# Patient Record
Sex: FEMALE | Race: WHITE | Employment: PART TIME | URBAN - NONMETROPOLITAN AREA
[De-identification: names, ages, dates, MRNs, and addresses within clinical notes are randomized per-mention and may not be internally consistent; named-entity substitution may affect disease eponyms.]

---

## 2018-03-22 ENCOUNTER — OFFICE VISIT (OUTPATIENT)
Dept: OPTOMETRY | Age: 68
End: 2018-03-22
Payer: COMMERCIAL

## 2018-03-22 DIAGNOSIS — H52.4 ASTIGMATISM OF BOTH EYES WITH PRESBYOPIA: Primary | ICD-10-CM

## 2018-03-22 DIAGNOSIS — H52.203 ASTIGMATISM OF BOTH EYES WITH PRESBYOPIA: Primary | ICD-10-CM

## 2018-03-22 PROCEDURE — 92014 COMPRE OPH EXAM EST PT 1/>: CPT | Performed by: OPTOMETRIST

## 2018-03-22 RX ORDER — BENOXINATE HCL/FLUORESCEIN SOD 0.4%-0.25%
1 DROPS OPHTHALMIC (EYE) ONCE
Status: COMPLETED | OUTPATIENT
Start: 2018-03-22 | End: 2018-03-22

## 2018-03-22 RX ORDER — LEVALBUTEROL 1.25 MG/.5ML
1 SOLUTION, CONCENTRATE RESPIRATORY (INHALATION)
COMMUNITY

## 2018-03-22 RX ORDER — SUCRALFATE 1 G/1
TABLET ORAL
COMMUNITY

## 2018-03-22 RX ORDER — SACCHAROMYCES BOULARDII 250 MG
CAPSULE ORAL
COMMUNITY

## 2018-03-22 RX ORDER — FUROSEMIDE 20 MG/1
20 TABLET ORAL
COMMUNITY
Start: 2017-09-24

## 2018-03-22 RX ADMIN — Medication 1 DROP: at 08:58

## 2018-03-22 ASSESSMENT — REFRACTION_MANIFEST
OD_CYLINDER: -0.75
OD_ADD: +2.50
OD_AXIS: 090
OS_CYLINDER: -0.50
OS_AXIS: 060
OS_SPHERE: -0.75
OD_SPHERE: PLANO
OS_ADD: +2.50

## 2018-03-22 ASSESSMENT — REFRACTION_WEARINGRX
OD_AXIS: 087
OS_SPHERE: -0.75
OD_CYLINDER: -0.75
OS_ADD: +2.50
OS_AXIS: 045
OS_CYLINDER: -0.50
OD_ADD: +2.50
SPECS_TYPE: BIFOCAL
OD_SPHERE: -0.25

## 2018-03-22 ASSESSMENT — TONOMETRY
OS_IOP_MMHG: 19
IOP_METHOD: APPLANATION W FLURESS DROP
OD_IOP_MMHG: 19

## 2018-03-22 ASSESSMENT — SLIT LAMP EXAM - LIDS
COMMENTS: NORMAL
COMMENTS: NORMAL

## 2018-03-22 ASSESSMENT — VISUAL ACUITY
OD_CC+: -1
OS_CC+: -1
OS_CC: 20/20
OD_CC: 20/25 OU
METHOD: SNELLEN - LINEAR
CORRECTION_TYPE: GLASSES

## 2018-03-22 NOTE — PROGRESS NOTES
both eyes with presbyopia           There are no Patient Instructions on file for this visit.    Return in about 2 years (around 3/22/2020) for complete eye exam.

## 2022-04-05 ENCOUNTER — OFFICE VISIT (OUTPATIENT)
Dept: OPTOMETRY | Age: 72
End: 2022-04-05
Payer: COMMERCIAL

## 2022-04-05 DIAGNOSIS — H52.4 ASTIGMATISM OF BOTH EYES WITH PRESBYOPIA: Primary | ICD-10-CM

## 2022-04-05 DIAGNOSIS — H25.813 COMBINED FORMS OF AGE-RELATED CATARACT OF BOTH EYES: ICD-10-CM

## 2022-04-05 DIAGNOSIS — H52.203 ASTIGMATISM OF BOTH EYES WITH PRESBYOPIA: Primary | ICD-10-CM

## 2022-04-05 PROCEDURE — 92004 COMPRE OPH EXAM NEW PT 1/>: CPT | Performed by: OPTOMETRIST

## 2022-04-05 ASSESSMENT — KERATOMETRY
OD_AXISANGLE_DEGREES: 050
METHOD_AUTO_MANUAL: AUTOMATED
OD_K1POWER_DIOPTERS: 44.50
OD_AXISANGLE2_DEGREES: 140
OS_K2POWER_DIOPTERS: 45.00
OS_AXISANGLE2_DEGREES: 045
OS_AXISANGLE_DEGREES: 135
OS_K1POWER_DIOPTERS: 44.75
OD_K2POWER_DIOPTERS: 44.75

## 2022-04-05 ASSESSMENT — TONOMETRY
OS_IOP_MMHG: 15
IOP_METHOD: NON-CONTACT AIR PUFF
OD_IOP_MMHG: 15

## 2022-04-05 ASSESSMENT — REFRACTION_WEARINGRX
OD_ADD: +2.50
OS_AXIS: 060
SPECS_TYPE: PAL
OS_CYLINDER: -0.50
OD_AXIS: 090
OS_SPHERE: -0.75
OD_CYLINDER: -0.75
OD_SPHERE: PLANO
OS_ADD: +2.50

## 2022-04-05 ASSESSMENT — ENCOUNTER SYMPTOMS
GASTROINTESTINAL NEGATIVE: 0
ALLERGIC/IMMUNOLOGIC NEGATIVE: 0
RESPIRATORY NEGATIVE: 0
EYES NEGATIVE: 0

## 2022-04-05 ASSESSMENT — REFRACTION_MANIFEST
OD_SPHERE: +0.75
OS_ADD: +2.50
OD_CYLINDER: -2.00
OS_CYLINDER: -0.50
OS_SPHERE: PLANO
OD_ADD: +2.50
OD_AXIS: 099
OS_AXIS: 060

## 2022-04-05 ASSESSMENT — VISUAL ACUITY
OS_CC: 20/50
METHOD: SNELLEN - LINEAR
OD_CC+: -1
OS_CC+: -1
OD_CC: 20/30
CORRECTION_TYPE: GLASSES

## 2022-04-05 ASSESSMENT — SLIT LAMP EXAM - LIDS
COMMENTS: NORMAL
COMMENTS: NORMAL

## 2022-04-05 NOTE — PROGRESS NOTES
Suhail Gerber presents today for   Chief Complaint   Patient presents with    Blurred Vision    Vision Exam   .    HPI     Blurred Vision     Laterality: both eyes    Quality: blurred    Context: distance vision and reading              Comments     Last Vision Exam: 3/22/2018 Aw  Last Ophthalmology Exam: n/a  Last Filled Glasses Rx: 3/22/2018  Insurance: Ret James  Update: [employer] and reading are getting more blurry  Full time glasses                Current Outpatient Medications   Medication Sig Dispense Refill    Cholecalciferol (VITAMIN D) 400 units CAPS Capsule Take 400 Units by mouth      furosemide (LASIX) 20 MG tablet Take 20 mg by mouth      levalbuterol (XOPENEX) 1.25 MG/0.5ML nebulizer solution Inhale 1 ampule into the lungs      Multiple Vitamins-Minerals (MULTIVITAMIN ADULT PO) Take 1 tablet by mouth      rivaroxaban (XARELTO) 20 MG TABS tablet Take 20 mg by mouth      saccharomyces boulardii (FLORASTOR) 250 MG capsule Probiotic CAPS TAKE AS DIRECTED.   Refills: 0 Active once a day      sertraline (ZOLOFT) 50 MG tablet Take 50 mg by mouth      sucralfate (CARAFATE) 1 GM tablet Sucralfate 1 GM Oral Tablet TAKE 1 TABLET 2 TIMES DAILY  Refills: 0 Active      metoprolol (TOPROL-XL) 25 MG XL tablet Take 12.5 mg by mouth daily      losartan (COZAAR) 25 MG tablet Take 25 mg by mouth daily One 25MG in the morning   Two 25MG in the evening      aspirin 81 MG tablet Take 81 mg by mouth daily      docusate sodium (COLACE) 100 MG capsule Take 100 mg by mouth 2 times daily      Omega-3 Fatty Acids (FISH OIL PO) Take by mouth      Misc Natural Products (GLUCOSAMINE CHONDROITIN VIT D3) CAPS Take by mouth      omeprazole (PRILOSEC) 20 MG capsule Take 20 mg by mouth daily      Multiple Vitamin (MULTI VITAMIN DAILY PO) Take by mouth      loratadine (ALLERGY RELIEF) 10 MG tablet Take 10 mg by mouth daily as needed      Acetaminophen (TYLENOL PO) Take by mouth as needed       No current facility-administered medications for this visit. Family History   Problem Relation Age of Onset    Glaucoma Maternal Grandfather      Social History     Socioeconomic History    Marital status:      Spouse name: None    Number of children: None    Years of education: None    Highest education level: None   Occupational History    None   Tobacco Use    Smoking status: Passive Smoke Exposure - Never Smoker    Smokeless tobacco: Never Used   Substance and Sexual Activity    Alcohol use: None    Drug use: None    Sexual activity: None   Other Topics Concern    None   Social History Narrative    None     Social Determinants of Health     Financial Resource Strain:     Difficulty of Paying Living Expenses: Not on file   Food Insecurity:     Worried About Running Out of Food in the Last Year: Not on file    Dodie of Food in the Last Year: Not on file   Transportation Needs:     Lack of Transportation (Medical): Not on file    Lack of Transportation (Non-Medical): Not on file   Physical Activity:     Days of Exercise per Week: Not on file    Minutes of Exercise per Session: Not on file   Stress:     Feeling of Stress : Not on file   Social Connections:     Frequency of Communication with Friends and Family: Not on file    Frequency of Social Gatherings with Friends and Family: Not on file    Attends Yazidi Services: Not on file    Active Member of [address] [place] [address] [employer] or Organizations: Not on file    Attends Club or Organization Meetings: Not on file    Marital Status: Not on file   Intimate Partner Violence:     Fear of Current or Ex-Partner: Not on file    Emotionally Abused: Not on file    Physically Abused: Not on file    Sexually Abused: Not on file   Housing Stability:     Unable to Pay for Housing in the Last Year: Not on file    Number of [place] in the Last Year: Not on file    Unstable Housing in the Last Year: Not on file     History reviewed.  No pertinent past medical history. ROS     Negative for: Constitutional, Gastrointestinal, Neurological, Skin, Genitourinary, Musculoskeletal, HENT, Endocrine, Cardiovascular, Eyes, Respiratory, Psychiatric, Allergic/Imm, Heme/Lymph          Main Ophthalmology Exam     External Exam       Right Left    External Normal Normal          Slit Lamp Exam       Right Left    Lids/Lashes Normal Normal    Conjunctiva/Sclera White and quiet White and quiet    Cornea Clear Clear    Anterior Chamber Deep and quiet Deep and quiet    Iris Round and reactive Round and reactive    Lens 1+ Nuclear sclerosis 1+ Nuclear sclerosis    Vitreous Normal Normal          Fundus Exam       Right Left    Disc Normal Normal    C/D Ratio 0.2-.25 0.2-. 25    Macula Normal Normal    Vessels Normal Normal             <div id=\"MAIN_EXAM_REVIEWED\"></div>      Tonometry     Tonometry (Non-contact air puff, 11:05 AM)       Right Left    Pressure 15 15      Right / Left  IOPg 13.5 / 12.9  CH 10.1 / 9.7  WS 9.1 / 5.6                     Not recorded       Not recorded         Visual Acuity (Snellen - Linear)       Right Left    Dist cc 20/30 -1 20/50 -1    Near cc 20/30     Correction: Glasses          Pupils     Pupils       Pupils    Right PERRL    Left PERRL              Neuro/Psych     Neuro/Psych     Oriented x3: Yes    Mood/Affect: Normal              Keratometry     Keratometry (Automated)       K1 Axis K2 Axis    Right 44.50 140 44.75 050    Left 44.75 045 45.00 135                  Ophthalmology Exam     Wearing Rx       Sphere Cylinder Axis Add    Right [place] -0.75 090 +2.50    Left -0.75 -0.50 060 +2.50    Age: 4yrs    Type: PAL              Manifest Refraction     Manifest Refraction       Sphere Cylinder Axis Dist VA Add    Right +0.75 -2.00 099 20/25-- +2.50    Left [place] -0.50 060 20/30 +2.50          Manifest Refraction #2 (Auto)       Sphere Cylinder Axis Dist VA Add    Right +1.00 -2.25 110      Left +0.00 -0.75 058                 Final Rx       Sphere Cylinder [place] Dist VA Add    Right +0.50 -1.75 099 20/25-- +2.50    Left [place] -0.50 060 20/30 +2.50    Type: PAL    Expiration Date: 4/5/2024            No orders of the defined types were placed in this encounter. IMPRESSION:  1. Astigmatism of both eyes with presbyopia    2. Combined forms of age-related cataract of both eyes        PLAN:    1. New glasses recommended  2. Monitor for future progression and visual significance. Counseled patient that more glare may be noticed and more light may be needed for reading.         Patient Instructions   New glasses recommended      Return in about 1 year (around 4/5/2023) for complete eye exam.